# Patient Record
Sex: MALE | Race: WHITE | NOT HISPANIC OR LATINO | Employment: UNEMPLOYED | URBAN - METROPOLITAN AREA
[De-identification: names, ages, dates, MRNs, and addresses within clinical notes are randomized per-mention and may not be internally consistent; named-entity substitution may affect disease eponyms.]

---

## 2019-10-16 ENCOUNTER — TRANSCRIBE ORDERS (OUTPATIENT)
Dept: ADMINISTRATIVE | Facility: HOSPITAL | Age: 44
End: 2019-10-16

## 2019-10-16 DIAGNOSIS — R13.10 DYSPHAGIA, UNSPECIFIED TYPE: Primary | ICD-10-CM

## 2019-12-18 ENCOUNTER — HOSPITAL ENCOUNTER (OUTPATIENT)
Dept: GENERAL RADIOLOGY | Facility: HOSPITAL | Age: 44
Discharge: HOME | End: 2019-12-18
Admitting: FAMILY MEDICINE

## 2019-12-18 DIAGNOSIS — R13.10 DYSPHAGIA, UNSPECIFIED TYPE: ICD-10-CM

## 2019-12-18 PROCEDURE — 74230 X-RAY XM SWLNG FUNCJ C+: CPT

## 2019-12-18 PROCEDURE — 92611 MOTION FLUOROSCOPY/SWALLOW: CPT

## 2019-12-18 RX ADMIN — BARIUM SULFATE 4 ML: 980 POWDER, FOR SUSPENSION ORAL at 13:38

## 2019-12-18 RX ADMIN — BARIUM SULFATE 50 ML: 400 SUSPENSION ORAL at 13:38

## 2019-12-18 RX ADMIN — BARIUM SULFATE 55 ML: 0.81 POWDER, FOR SUSPENSION ORAL at 13:38

## 2019-12-18 NOTE — MBS/VFSS/FEES
Speech Language Pathology   MBS FEES / Discharge Summary  [facility]       Patient Name: Jeevan Damon  : 1975  MRN: [id number]    Today's Date: 2019      Visit Date: 2019     Visit Dx:     ICD-10-CM ICD-9-CM   1. Dysphagia, unspecified type R13.10 787.20       There is no problem list on file for this patient.       History reviewed. No pertinent past medical history.     History reviewed. No pertinent surgical history.              SLP Adult Swallow Evaluation     Row Name 19 1515       Rehab Evaluation    Document Type  evaluation  -OC    Subjective Information  no complaints  -OC    Patient Observations  alert;cooperative;agree to therapy  -OC    Patient/Family Observations  SLP and additional staff present  -OC    Patient Effort  good  -OC    Symptoms Noted During/After Treatment  none  -OC       General Information    Patient Profile Reviewed  yes  -OC    Current Method of Nutrition  honey-thick liquids;pureed;other (see comments) 1 regular/thin meal a week for pleasure  -OC    Precautions/Limitations, Vision  WFL;for purposes of eval  -OC    Precautions/Limitations, Hearing  WFL;for purposes of eval  -OC    Prior Level of Function-Communication  cognitive-linguistic impairment  -OC    Prior Level of Function-Swallowing  puree;honey thick liquids  -OC    Plans/Goals Discussed with  patient;agreed upon;other (see comments) caregivers  -OC    Barriers to Rehab  cognitive status  -OC    Patient's Goals for Discharge  return to regular diet  -OC       Pain Assessment    Additional Documentation  Pain Scale: Numbers Pre/Post-Treatment (Group)  -OC       Pain Scale: Numbers Pre/Post-Treatment    Pre/Post Treatment Pain Comment  No pain reported  -OC       Oral Motor and Function    Dentition Assessment  natural, present and adequate  -OC    Secretion Management  WNL/WFL  -OC       MBS/VFSS Interpretation    VFSS Summary  VFSS completed, patient present with moderate oropharyngeal  dysphagia. Patient demonstrated generalized pharyngeal weakness and decreased coordination of swallow. Patient demonstrated trace penetration honey thick and nectar thick liquids with a chin tuck. Increased penetration honey thick and nectar thick liquids without chin tuck.  No penetration or aspiration with single sip of thin (no chin tuck), mild pharyngeal residue. Able to clear residue independently with double swallow. Deep penetration thin liquids consecutive sips, no chin tuck. Trace penetration thin liquids consecutive swallows with chin tuck. No penetration or aspiration puree, moderate pharyngeal residue.  Patient able to partially clear residue with repeat swallows.  Chin tuck and double swallow with puree aids in reduction of residue. Prolonged mastication soft texture with penetration and aspiration during the swallow. Mild-moderate residue, patient able to partially clear with double swallow and chin tuck. Prolonged mastication regular textures, minimal bolus clearance with first swallow. Patient independently performed chin tuck and was able to partially clear residue. Posterior aspiration, suspected mechanical soft residue, during regular trial.  Liquid wash aids in clearance of regular texture residue.  Esophageal scan within functional limits.  -OC       SLP Communication to Radiology    Summary Statement  VFSS completed, patient present with moderate oropharyngeal dysphagia. Patient demonstrated generalized pharyngeal weakness and decreased coordination of swallow. Patient demonstrated trace penetration honey thick and nectar thick liquids with a chin tuck. Increased penetration honey thick and nectar thick liquids without chin tuck.  No penetration or aspiration with single sip of thin (no chin tuck), mild pharyngeal residue. Able to clear residue independently with double swallow. Deep penetration thin liquids consecutive sips, no chin tuck. Trace penetration thin liquids consecutive swallows  with chin tuck. No penetration or aspiration puree, moderate pharyngeal residue.  Patient able to partially clear residue with repeat swallows.  Chin tuck and double swallow with puree aids in reduction of residue. Prolonged mastication soft texture with penetration and aspiration during the swallow. Mild-moderate residue, patient able to partially clear with double swallow and chin tuck. Prolonged mastication regular textures, minimal bolus clearance with first swallow. Patient independently performed chin tuck and was able to partially clear residue. Posterior aspiration, suspected mechanical soft residue, during regular trial.  Liquid wash aids in clearance of regular texture residue.  Esophageal scan within functional limits.  -OC       Clinical Impression    SLP Swallowing Diagnosis  moderate;oral dysfunction;pharyngeal dysfunction  -OC    Functional Impact  risk of aspiration/pneumonia  -OC    Rehab Potential/Prognosis, Swallowing  good, to achieve stated therapy goals  -OC    Swallow Criteria for Skilled Therapeutic Interventions Met  demonstrates skilled criteria  -OC       Recommendations    Therapy Frequency (Swallow)  evaluation only  -OC    Predicted Duration Therapy Intervention (Days)  until discharge  -OC    SLP Diet Recommendation  puree;thin liquids;other (see comments) chin tuck with puree  -OC    Recommended Diagnostics  reassess via VFSS (MBS);other (see comments) s/p EMST program completion  -OC    Recommended Precautions and Strategies  upright posture during/after eating;small bites of food and sips of liquid;no straw;alternate between small bites of food and sips of liquid;other (see comments) chin tuck with puree (and solids if PO for pleasure)  -OC    SLP Rec. for Method of Medication Administration  meds whole;with pudding or applesauce  -OC    Monitor for Signs of Aspiration  yes;notify SLP if any concerns  -OC    Anticipated Dischage Disposition  anticipate therapy at next level of  care;other (see comments) recommend EMST  -OC      User Key  (r) = Recorded By, (t) = Taken By, (c) = Cosigned By    Initials Name Provider Type    Loly Sandhu MA, CCC-SLP Speech and Language Pathologist                         OP SLP Education     Row Name 12/18/19 1515       Education    Barriers to Learning  Decreased comprehension  -OC    Action Taken to Address Barriers  Caregiver and SLP present  -OC    Education Provided  Described results of evaluation;Patient expressed understanding of evaluation;Family/caregivers expressed understanding of evaluation;Family/caregivers participated in establishing goals and treatment plan;Family/caregivers demonstrated recommended strategies;Patient requires further education on strategies, risks  -OC    Assessed  Learning needs;Learning preferences;Learning motivation;Learning readiness  -OC    Learning Motivation  Strong  -OC    Learning Method  Explanation  -OC    Teaching Response  Verbalized understanding  -OC    Education Comments  SLP reported to patient all staff would need to be trained prior to upgrade.  -OC      User Key  (r) = Recorded By, (t) = Taken By, (c) = Cosigned By    Initials Name Effective Dates    Loly Sandhu MA, CCC-SLP 06/08/18 -               OP SLP Assessment/Plan - 12/18/19 1515        SLP Assessment    Functional Problems  Swallowing   -OC    Impact on Function: Swallowing  Risk of aspiration;Risk of pneumonia   -OC    Clinical Impression: Swallowing  Moderate:;oropharyngeal phase dysphagia   -OC    Functional Problems Comment  penetration and aspiration   -OC    Clinical Impression Comments  improvement, recommend advancing to thin liquids & continue with puree   -OC    Please refer to paper survey for additional self-reported information  No   -OC    Please refer to items scanned into chart for additional diagnostic informaiton and handouts as provided by clinician  No   -OC    SLP Diagnosis  Moderate oropharyngeal dysphagia   -OC     Prognosis  Good (comment)   -OC    Patient/caregiver participated in establishment of treatment plan and goals  Yes   -OC    Patient would benefit from skilled therapy intervention  Yes   -OC       SLP Plan    Frequency  TBD   -OC    Duration  TBD   -OC    Planned CPT's?  SLP MBS: 98601   -OC    Plan Comments  Discussed with pt's current SLP (present for evaluation). Recommend EMST as therapy modality.  Primary SLP verbalized agreement.   -OC      User Key  (r) = Recorded By, (t) = Taken By, (c) = Cosigned By    Initials Name Provider Type    Loly Sandhu MA,CCC-SLP Speech and Language Pathologist              SLP Outcome Measures (last 72 hours)      SLP Outcome Measures     Row Name 12/18/19 1515             SLP Outcome Measures    Outcome Measure Used?  Adult NOMS  -OC         Adult FCM Scores    FCM Chosen  Swallowing  -OC      Swallowing FCM Score  4  -OC        User Key  (r) = Recorded By, (t) = Taken By, (c) = Cosigned By    Initials Name Effective Dates    Loly Sandhu MA,FILI-SLP 06/08/18 -                          Time Calculation:        Therapy Charges for Today     Code Description Service Date Service Provider Modifiers Qty    84881726780 HC ST MOTION FLUORO EVAL SWALLOW 7 12/18/2019 Loly Shaw MA,CCC-SLP GN 1                  Loly Shaw MA, CCC-SLP  12/18/2019

## 2023-05-28 ENCOUNTER — ON CAMPUS - OUTPATIENT (OUTPATIENT)
Dept: URBAN - METROPOLITAN AREA HOSPITAL 108 | Facility: HOSPITAL | Age: 48
End: 2023-05-28

## 2023-05-28 DIAGNOSIS — T18.108A UNSPECIFIED FOREIGN BODY IN ESOPHAGUS CAUSING OTHER INJURY,: ICD-10-CM

## 2023-05-28 PROCEDURE — 43247 EGD REMOVE FOREIGN BODY: CPT | Performed by: INTERNAL MEDICINE
